# Patient Record
Sex: MALE | Race: WHITE | Employment: FULL TIME | URBAN - METROPOLITAN AREA
[De-identification: names, ages, dates, MRNs, and addresses within clinical notes are randomized per-mention and may not be internally consistent; named-entity substitution may affect disease eponyms.]

---

## 2017-03-29 ENCOUNTER — TELEPHONE (OUTPATIENT)
Dept: FAMILY MEDICINE | Facility: CLINIC | Age: 62
End: 2017-03-29

## 2017-03-29 NOTE — LETTER
[address] [id number] [phone]  Dept: [phone]      June 1, 2017      Vitaliy Marin  [address] [place] [address]    Dear Vitaliy Marin,     At [facility] we care about your health and are committed to providing quality patient care.    Which includes staying current on preventive cancer screenings.  You can increase your chances of finding and treating cancers through regular screenings.      Our records indicate you may be due for the following preventive screening(s):    Colonoscopy    Colonoscopy is recommended every ten years for everyone age 50 and older. Please take a moment to read over the enclosed information packet about colon cancer screening. We strongly urge our patient's to consider having a colonoscopy done, which is the best screening test available and only needs to be done every 10 years if normal. If you are unwilling or unable to have a colonoscopy then we recommend the annual stool testing for blood. This test is called a FIT test and it looks for blood in the stool.     To schedule an appointment for your colonoscopy, please see the attached referral.     To schedule an appointment or discuss this screening further, you may contact us by phone at the [facility] at [phone] or online through the patient portal/[email or web address]    If you have had any of the screenings listed above at another facility, please call us so that we may update your chart.      Your partners in health,      Quality Committee at [facility]

## 2017-03-29 NOTE — TELEPHONE ENCOUNTER
3/29/2017    Call Regarding Preventive Health Screening Colonoscopy    Attempt 1    Message with female    Comments:       Outreach   KV

## 2017-06-01 NOTE — TELEPHONE ENCOUNTER
Panel Management Review          Fail List measure:       Patient is due/failing the following:   COLONOSCOPY    Action needed:   none    Type of outreach:    Sent letter.    Questions for provider review:    None                                                                                                                                    Olinda Marks MA

## 2018-11-10 ENCOUNTER — OFFICE VISIT (OUTPATIENT)
Dept: URGENT CARE | Facility: URGENT CARE | Age: 63
End: 2018-11-10
Payer: COMMERCIAL

## 2018-11-10 VITALS
DIASTOLIC BLOOD PRESSURE: 102 MMHG | OXYGEN SATURATION: 95 % | WEIGHT: 237 LBS | TEMPERATURE: 98.3 F | SYSTOLIC BLOOD PRESSURE: 153 MMHG | HEART RATE: 64 BPM | BODY MASS INDEX: 30.1 KG/M2 | RESPIRATION RATE: 16 BRPM

## 2018-11-10 DIAGNOSIS — H57.8A9 SENSATION OF FOREIGN BODY IN EYE: Primary | ICD-10-CM

## 2018-11-10 DIAGNOSIS — S05.8X1A: ICD-10-CM

## 2018-11-10 PROCEDURE — 99214 OFFICE O/P EST MOD 30 MIN: CPT | Performed by: PHYSICIAN ASSISTANT

## 2018-11-10 RX ORDER — POLYMYXIN B SULFATE AND TRIMETHOPRIM 1; 10000 MG/ML; [USP'U]/ML
1 SOLUTION OPHTHALMIC EVERY 4 HOURS
Qty: 3 ML | Refills: 0 | Status: SHIPPED | OUTPATIENT
Start: 2018-11-10 | End: 2018-11-17

## 2018-11-10 NOTE — PROGRESS NOTES
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  He specifically feels a piece of wood in the right upper medial lid area.  He was sawing some wood yesterday and a piece flew underneath his active glasses.  He woke up in the middle of the night with quite a bit of pain.  He denies any decreased vision.  No fever.  No ST or otalgia        Allergies   Allergen Reactions     Penicillins Hives       No past medical history on file.      Current Outpatient Prescriptions on File Prior to Visit:  aspirin 325 MG tablet Take  by mouth daily.   clotrimazole (LOTRIMIN) 1 % cream Apply topically 2 times daily   diclofenac (VOLTAREN) 75 MG EC tablet Take 1 tablet (75 mg) by mouth 2 times daily (with meals) as needed for joint pain.     No current facility-administered medications on file prior to visit.     Social History   Substance Use Topics     Smoking status: Former Smoker     Smokeless tobacco: Never Used     Alcohol use Yes       ROS:  General: negative for fever  EYE: as above  ENT: as above    OBJECTIVE:  BP (!) 153/102 (BP Location: Left arm, Patient Position: Chair, Cuff Size: Adult Large)  Pulse 64  Temp 98.3  F (36.8  C) (Oral)  Resp 16  Wt 237 lb (107.5 kg)  SpO2 95%  BMI 30.1 kg/m2     General:   awake, alert, and cooperative.  NAD.   Head: Normocephalic, atraumatic.  Eyes: Right eye conjunctiva and sclera are with mild injection.  EOMs intact without pain.  Pupils equal reactive to light and accommodation.  Fluorescein dye and proparacaine drops are placed in the right eye.  With Woods lamp I see no uptake.  There is a diffuse bump in the inner right medial upper eyelid.  Almost looks like a stye.  The surface looks rough and irritated almost like an abrasion.  When I push on this area with a Q-tip he feels sudden pain.  He states this is the exact location where he feels the foreign body is at.  With palpation of the area I do not feel any wood foreign body object  underneath the skin.  The eye is flushed copiously with Eye Stream.       ASSESSMENT:    ICD-10-CM    1. Sensation of foreign body in eye H57.9 trimethoprim-polymyxin b (POLYTRIM) ophthalmic solution     OPTOMETRY REFERRAL   2. Eye injury, superficial, right, initial encounter S05.8X1A trimethoprim-polymyxin b (POLYTRIM) ophthalmic solution     OPTOMETRY REFERRAL           PLAN: Discussed that there could be a piece of wood that completely embedded underneath the skin.  I cannot rule this out here.  The other possibility is an abrasion and possibly early stye formation.  He is given antibiotic eyedrops.  If the pain does not get better throughout the day he is instructed to go to the emergency room.  Otherwise he is given referral for optometry to be seen on Monday.  Advised about symptoms which might herald more serious problems.   Shweta Ervin PA-C

## 2018-11-10 NOTE — MR AVS SNAPSHOT
After Visit Summary   11/10/2018    Vitaliy Marin    MRN: [id number]           Patient Information     Date Of Birth          1955        Visit Information        Provider Department      11/10/2018 9:15 AM Shweta Ervin PA-C [facility]        Today's Diagnoses     Sensation of foreign body in eye    -  1    Eye injury, superficial, right, initial encounter           Follow-ups after your visit        Additional Services     OPTOMETRY REFERRAL       Your provider has referred you to: [facility]: [facility] [phone]    [email or web address]    Please be aware that coverage of these services is subject to the terms and limitations of your health insurance plan.  Call member services at your health plan with any benefit or coverage questions.      Please bring the following with you to your appointment:    (1) Any X-Rays, CTs or MRIs which have been performed.  Contact the facility where they were done to arrange for  prior to your scheduled appointment.    (2) List of current medications  (3) This referral request   (4) Any documents/labs given to you for this referral                  Who to contact     If you have questions or need follow up information about today's clinic visit or your schedule please contact [facility] directly at [phone].  Normal or non-critical lab and imaging results will be communicated to you by MyChart, letter or phone within 4 business days after the clinic has received the results. If you do not hear from us within 7 days, please contact the clinic through MyChart or phone. If you have a critical or abnormal lab result, we will notify you by phone as soon as possible.  Submit refill requests through [employer] or call your pharmacy and they will forward the refill request to us. Please allow 3 business days for your refill to be completed.           Additional Information About Your Visit        Care EveryWhere ID     This is your Care EveryWhere ID. This could be used by other organizations to access your [facility] medical records  [id number]        Your Vitals Were     Pulse Temperature Respirations Pulse Oximetry BMI (Body Mass Index)       64 98.3  F (36.8  C) (Oral) 16 95% 30.1 kg/m2        Blood Pressure from Last 3 Encounters:   11/10/18 (!) 153/102   05/09/16 126/86   10/19/15 122/81    Weight from Last 3 Encounters:   11/10/18 237 lb (107.5 kg)   05/09/16 240 lb 12.8 oz (109.2 kg)   11/18/15 232 lb 6.4 oz (105.4 kg)              We Performed the Following     OPTOMETRY REFERRAL          Today's Medication Changes          These changes are accurate as of 11/10/18  9:33 AM.  If you have any questions, ask your nurse or doctor.               Start taking these medicines.        Dose/Directions    trimethoprim-polymyxin b ophthalmic solution   Commonly known as:  POLYTRIM   Used for:  Sensation of foreign body in eye, Eye injury, superficial, right, initial encounter   Started by:  Shweta Ervin PA-C        Dose:  1 drop   Place 1 drop into the right eye every 4 hours for 7 days   Quantity:  3 mL   Refills:  0            Where to get your medicines      These medications were sent to [facility] - [place], [place] - [address]  [address], [facility] [address]     Phone:  [phone]     trimethoprim-polymyxin b ophthalmic solution                Primary Care Provider Office Phone # Fax #    Hfzau Rey Currie [phone] [address] [facility] [address]        Equal Access to Services     CATERINA HANSEN AH: Hadii aad ku hadasho Soomaali, waaxda luqadaha, qaybta kaalmada adeegyada, miryam reaves haydaltonn lilliana pinon. So [facility] [phone].    ATENCIÓN: Si habla español, tiene a rivers disposición servicios gratuitos de asistencia lingüística. Llame al [phone].    We comply  with applicable federal civil rights laws and [place] laws. We do not discriminate on the basis of race, color, national origin, age, disability, sex, sexual orientation, or gender identity.            Thank you!     Thank you for choosing [facility]  for your care. Our goal is always to provide you with excellent care. Hearing back from our patients is one way we can continue to improve our services. Please take a few minutes to complete the written survey that you may receive in the mail after your visit with us. Thank you!             Your Updated Medication List - Protect others around you: Learn how to safely use, store and throw away your medicines at www.disposemymeds.org.          This list is accurate as of 11/10/18  9:33 AM.  Always use your most recent med list.                   Brand Name Dispense Instructions for use Diagnosis    aspirin 325 MG tablet      Take  by mouth daily.        clotrimazole 1 % cream    LOTRIMIN    15 g    Apply topically 2 times daily    Tinea corporis       diclofenac 75 MG EC tablet    VOLTAREN    60 tablet    Take 1 tablet (75 mg) by mouth 2 times daily (with meals) as needed for joint pain.    Baker's cyst of knee, right       trimethoprim-polymyxin b ophthalmic solution    POLYTRIM    3 mL    Place 1 drop into the right eye every 4 hours for 7 days    Sensation of foreign body in eye, Eye injury, superficial, right, initial encounter

## 2019-07-25 ENCOUNTER — OFFICE VISIT (OUTPATIENT)
Dept: OPTOMETRY | Facility: CLINIC | Age: 64
End: 2019-07-25
Payer: COMMERCIAL

## 2019-07-25 DIAGNOSIS — H01.02B SQUAMOUS BLEPHARITIS OF UPPER AND LOWER EYELIDS OF BOTH EYES: ICD-10-CM

## 2019-07-25 DIAGNOSIS — H01.02A SQUAMOUS BLEPHARITIS OF UPPER AND LOWER EYELIDS OF BOTH EYES: ICD-10-CM

## 2019-07-25 DIAGNOSIS — H00.014 HORDEOLUM EXTERNUM OF LEFT UPPER EYELID: Primary | ICD-10-CM

## 2019-07-25 PROCEDURE — 92002 INTRM OPH EXAM NEW PATIENT: CPT | Performed by: OPTOMETRIST

## 2019-07-25 ASSESSMENT — VISUAL ACUITY
OD_SC: 20/25
METHOD: SNELLEN - LINEAR
OD_SC+: -2
OS_SC: 20/20

## 2019-07-25 ASSESSMENT — TONOMETRY
OS_IOP_MMHG: 10
IOP_METHOD: APPLANATION

## 2019-07-25 ASSESSMENT — EXTERNAL EXAM - RIGHT EYE: OD_EXAM: NORMAL

## 2019-07-25 ASSESSMENT — EXTERNAL EXAM - LEFT EYE: OS_EXAM: NORMAL

## 2019-07-25 NOTE — PROGRESS NOTES
Chief Complaint   Patient presents with     Foreign Body in Eye     Foreign Body in Eye   HPI    Foreign Body in Eye      Laterality:  left eye     Duration:  9 months     Associated symptoms:  redness, photophobia, and lid swelling     Pain scale:  0/10     Treatments tried:  eye drops     Response to treatment:  no improvement   Comments      Patient felt FB in os eye in November 9 months ago under upper eyelid. Irritation comes and goes now lid swelling and bump on upper lid. Patient was given polymyxin b last November drops hurt to use.     Thu Day, Optometric Assistant, A.B.O.C.       Medical, surgical and family histories reviewed and updated 7/25/2019.       OBJECTIVE: See Ophthalmology exam    ASSESSMENT:    ICD-10-CM    1. Hordeolum externum of left upper eyelid H00.014 tobramycin-dexamethasone (TOBRADEX) 0.3-0.1 % ophthalmic ointment   2. Squamous blepharitis of upper and lower eyelids of both eyes H01.02A     H01.02B       PLAN:     Patient Instructions   Warm packs- 2-3 x day for 7-10 days.    Maxitrol ointment to eyelid 3 x day for 1 week.    Blink tears/Systane Balance- 1 drop both eyes 2-4 x daily.    Ocusoft Oust foaming eyelid cleanser which can be purchase on Amazon or Blephadex eyelid wipes- cleanse eyelids 2 x day for 6 weeks then nightly.  These can be purchased on Amazon.com.    Recommend annual eye exams.    Priyank Jenkins, OD

## 2019-07-25 NOTE — LETTER
7/25/2019         RE: Vitaliy Marin  [address] [address] [place] [address]        Dear Colleague,    Thank you for referring your patient, Vitaliy Marin, to the [facility]. Please see a copy of my visit note below.    Chief Complaint   Patient presents with     Foreign Body in Eye     Foreign Body in Eye   HPI    Foreign Body in Eye      Laterality:  left eye     Duration:  9 months     Associated symptoms:  redness, photophobia, and lid swelling     Pain scale:  0/10     Treatments tried:  eye drops     Response to treatment:  no improvement   Comments      Patient felt FB in os eye in November 9 months ago under upper eyelid. Irritation comes and goes now lid swelling and bump on upper lid. Patient was given polymyxin b last November drops hurt to use.     Thu Day, Optometric Assistant, A.B.O.C.       Medical, surgical and family histories reviewed and updated 7/25/2019.       OBJECTIVE: See Ophthalmology exam    ASSESSMENT:    ICD-10-CM    1. Hordeolum externum of left upper eyelid H00.014 tobramycin-dexamethasone (TOBRADEX) 0.3-0.1 % ophthalmic ointment   2. Squamous blepharitis of upper and lower eyelids of both eyes H01.02A     H01.02B       PLAN:     Patient Instructions   Warm packs- 2-3 x day for 7-10 days.    Maxitrol ointment to eyelid 3 x day for 1 week.    Blink tears/Systane Balance- 1 drop both eyes 2-4 x daily.    Ocusoft Oust foaming eyelid cleanser which can be purchase on Amazon or Blephadex eyelid wipes- cleanse eyelids 2 x day for 6 weeks then nightly.  These can be purchased on Amazon.com.    Recommend annual eye exams.    Priyank Jenkins, OD               Again, thank you for allowing me to participate in the care of your patient.        Sincerely,        Priyank Jenkins, OD

## 2019-07-25 NOTE — PATIENT INSTRUCTIONS
Warm packs- 2-3 x day for 7-10 days.    Maxitrol ointment to eyelid 3 x day for 1 week.    Blink tears/Systane Balance- 1 drop both eyes 2-4 x daily.    Ocusoft Oust foaming eyelid cleanser which can be purchase on Amazon or Blephadex eyelid wipes- cleanse eyelids 2 x day for 6 weeks then nightly.  These can be purchased on Amazon.com.    Recommend annual eye exams.    Priyank Jenkins, OD      Use one drop of artificial tears both eyes 3-4 x daily.  Continue to use the drops regardless if your eyes are comfortable.  Artificial tears work best as a preventative and not as well after your eyes are starting to bother you.  It may take 4- 6 weeks of using the drops before you notice improvement.  If after that time you are still having problems schedule an appointment for an evaluation and discussion of different treatments such as Restasis or Xiidra.  Dry eyes are a chronic condition and you may have more symptoms at certain times of the year.    Excess tearing can be due to the right tears not working properly or a blockage in the tear drainage system.  You can try using artificial tears 1 drop right eye 3-4 x day.  If the excess tearing is bothersome after 3-4 weeks and that doesn't help we can send you for further testing on the puncta which allows the tears to drain.  This would entail a referral to our oculo plastic specialist at the [facility].    Brands of drops that are recommended are:    Systane Complete  Systane Ultra  Systane Balance  Refresh Advanced Optive  Blink    If you are using drops more than 4 x day or have sensitivities to preservatives I recommend non preserved artificial tears.  These come in 1 use vials.  They can be used every 1-2 hours.    Brands of drops that are recommended are:    Systane- preservative free  Refresh-  preservative free  Blink- preservative free    Gels or ointment can be used at night.    Brands recommended are:    Systane Gel  Refresh Gel  Blink Gel  Genteal  Gel    Systane night time (ointment)  Refresh Celluvisc  Refresh PM (ointment)      Visine, Clear Eyes or Murine (drops that get the red out) can irritate the eyes and cause a rebound effect where the eyes become more red and you end up using more drops.  Avoid drops containing tetrahydrozoline, naphazoline, phenylephrine, oxymetazoline.      OTC Lumify is a newer product that gives immediate redness relief with out the rebound effect.  Use as needed to take the redness out.    Artificial tears may be used with other drops (such as allergy, glaucoma, antibiotics) around the same time.  Be sure to wait 5 minutes in between drops.    Heat to the eyelids can also improve your symptoms of dry eyes.  Mathew heat masks can be purchased at Amazon to be used daily for 10-15 minutes.  Other options are gel masks that can be put in the microwave and purchased at most pharmacies.

## 2019-12-03 ENCOUNTER — ANCILLARY PROCEDURE (OUTPATIENT)
Dept: GENERAL RADIOLOGY | Facility: CLINIC | Age: 64
End: 2019-12-03
Attending: PHYSICIAN ASSISTANT
Payer: COMMERCIAL

## 2019-12-03 ENCOUNTER — OFFICE VISIT (OUTPATIENT)
Dept: FAMILY MEDICINE | Facility: CLINIC | Age: 64
End: 2019-12-03
Payer: COMMERCIAL

## 2019-12-03 VITALS
TEMPERATURE: 98.3 F | DIASTOLIC BLOOD PRESSURE: 80 MMHG | HEART RATE: 65 BPM | OXYGEN SATURATION: 97 % | BODY MASS INDEX: 28.75 KG/M2 | WEIGHT: 224 LBS | SYSTOLIC BLOOD PRESSURE: 111 MMHG | HEIGHT: 74 IN

## 2019-12-03 DIAGNOSIS — R10.84 ABDOMINAL PAIN, GENERALIZED: Primary | ICD-10-CM

## 2019-12-03 DIAGNOSIS — R19.8 RUQ FULLNESS: ICD-10-CM

## 2019-12-03 DIAGNOSIS — R19.7 DIARRHEA, UNSPECIFIED TYPE: ICD-10-CM

## 2019-12-03 DIAGNOSIS — R10.84 ABDOMINAL PAIN, GENERALIZED: ICD-10-CM

## 2019-12-03 LAB
ERYTHROCYTE [DISTWIDTH] IN BLOOD BY AUTOMATED COUNT: 13.2 % (ref 10–15)
HCT VFR BLD AUTO: 43.4 % (ref 40–53)
HGB BLD-MCNC: 14.6 G/DL (ref 13.3–17.7)
MCH RBC QN AUTO: 29.9 PG (ref 26.5–33)
MCHC RBC AUTO-ENTMCNC: 33.6 G/DL (ref 31.5–36.5)
MCV RBC AUTO: 89 FL (ref 78–100)
PLATELET # BLD AUTO: 257 10E9/L (ref 150–450)
RBC # BLD AUTO: 4.89 10E12/L (ref 4.4–5.9)
WBC # BLD AUTO: 8 10E9/L (ref 4–11)

## 2019-12-03 PROCEDURE — 80048 BASIC METABOLIC PNL TOTAL CA: CPT | Performed by: PHYSICIAN ASSISTANT

## 2019-12-03 PROCEDURE — 99214 OFFICE O/P EST MOD 30 MIN: CPT | Performed by: PHYSICIAN ASSISTANT

## 2019-12-03 PROCEDURE — 83690 ASSAY OF LIPASE: CPT | Performed by: PHYSICIAN ASSISTANT

## 2019-12-03 PROCEDURE — 36415 COLL VENOUS BLD VENIPUNCTURE: CPT | Performed by: PHYSICIAN ASSISTANT

## 2019-12-03 PROCEDURE — 74019 RADEX ABDOMEN 2 VIEWS: CPT

## 2019-12-03 PROCEDURE — 85027 COMPLETE CBC AUTOMATED: CPT | Performed by: PHYSICIAN ASSISTANT

## 2019-12-03 PROCEDURE — 80076 HEPATIC FUNCTION PANEL: CPT | Performed by: PHYSICIAN ASSISTANT

## 2019-12-03 RX ORDER — ONDANSETRON 4 MG/1
4 TABLET, ORALLY DISINTEGRATING ORAL EVERY 8 HOURS PRN
Qty: 12 TABLET | Refills: 0 | Status: SHIPPED | OUTPATIENT
Start: 2019-12-03

## 2019-12-03 RX ORDER — ONDANSETRON 4 MG/1
4 TABLET, ORALLY DISINTEGRATING ORAL EVERY 8 HOURS PRN
Qty: 12 TABLET | Refills: 0 | Status: SHIPPED | OUTPATIENT
Start: 2019-12-03 | End: 2019-12-03

## 2019-12-03 ASSESSMENT — MIFFLIN-ST. JEOR: SCORE: 1880.81

## 2019-12-03 ASSESSMENT — PAIN SCALES - GENERAL: PAINLEVEL: NO PAIN (1)

## 2019-12-04 LAB
ALBUMIN SERPL-MCNC: 3.9 G/DL (ref 3.4–5)
ALP SERPL-CCNC: 63 U/L (ref 40–150)
ALT SERPL W P-5'-P-CCNC: 61 U/L (ref 0–70)
ANION GAP SERPL CALCULATED.3IONS-SCNC: 3 MMOL/L (ref 3–14)
AST SERPL W P-5'-P-CCNC: 40 U/L (ref 0–45)
BILIRUB DIRECT SERPL-MCNC: 0.1 MG/DL (ref 0–0.2)
BILIRUB SERPL-MCNC: 0.8 MG/DL (ref 0.2–1.3)
BUN SERPL-MCNC: 20 MG/DL (ref 7–30)
CALCIUM SERPL-MCNC: 9 MG/DL (ref 8.5–10.1)
CHLORIDE SERPL-SCNC: 109 MMOL/L (ref 94–109)
CO2 SERPL-SCNC: 28 MMOL/L (ref 20–32)
CREAT SERPL-MCNC: 1.1 MG/DL (ref 0.66–1.25)
GFR SERPL CREATININE-BSD FRML MDRD: 71 ML/MIN/{1.73_M2}
GLUCOSE SERPL-MCNC: 81 MG/DL (ref 70–99)
LIPASE SERPL-CCNC: 172 U/L (ref 73–393)
POTASSIUM SERPL-SCNC: 4.2 MMOL/L (ref 3.4–5.3)
PROT SERPL-MCNC: 7.2 G/DL (ref 6.8–8.8)
SODIUM SERPL-SCNC: 140 MMOL/L (ref 133–144)

## 2019-12-04 NOTE — PATIENT INSTRUCTIONS
Please call [facility] at [phone] to schedule your appointment for US of the gallbladder     Take Zofran 4 mg every 8 hours as needed for nausea  Take Metamucil 1 tbls daily   Hydrate   Gas X for gas pains

## 2019-12-05 ENCOUNTER — ANCILLARY PROCEDURE (OUTPATIENT)
Dept: ULTRASOUND IMAGING | Facility: CLINIC | Age: 64
End: 2019-12-05
Attending: PHYSICIAN ASSISTANT
Payer: COMMERCIAL

## 2019-12-05 DIAGNOSIS — R19.8 RUQ FULLNESS: ICD-10-CM

## 2019-12-05 PROCEDURE — 76700 US EXAM ABDOM COMPLETE: CPT

## 2020-11-24 ENCOUNTER — VIRTUAL VISIT (OUTPATIENT)
Dept: FAMILY MEDICINE | Facility: OTHER | Age: 65
End: 2020-11-24

## 2020-11-24 DIAGNOSIS — Z20.822 SUSPECTED COVID-19 VIRUS INFECTION: Primary | ICD-10-CM

## 2020-11-24 NOTE — PROGRESS NOTES
"Date: 2020 10:37:21  Clinician: Jordan Gregory  Clinician NPI: [id number]  Patient: Vitaliy Marin  Patient : 1955  Patient Address: [address] [place] [address] [place] [address]  Patient Phone: [phone]  Visit Protocol: URI  Patient Summary:  Vitaliy is a 65 year old ( : 1955 ) male who initiated a [facility] Visit for COVID-19 (Coronavirus) evaluation and screening. When asked the question \"Please sign me up to receive news, health information and promotions from [facility].\", Vitaliy responded \"Yes\".    Vitaliy states his symptoms started 1-2 days ago.   His symptoms consist of rhinitis, myalgia, chills, malaise, a headache, a cough, and nasal congestion. Vitaliy also feels feverish.   Symptom details     Nasal secretions: The color of his mucus is white.    Cough: Vitaliy coughs every 5-10 minutes and his cough is not more bothersome at night. Phlegm does not come into his throat when he coughs. He believes his cough is caused by post-nasal drip.     Temperature: His current temperature is 100 degrees Fahrenheit.     Headache: He states the headache is mild (1-3 on a 10 point pain scale).      Vitaliy denies having vomiting, facial pain or pressure, sore throat, teeth pain, ageusia, diarrhea, ear pain, wheezing, nausea, and anosmia. He also denies taking antibiotic medication in the past month, having recent facial or sinus surgery in the past 60 days, and having a sinus infection within the past year. He is not experiencing dyspnea.   Precipitating events  He has not recently been exposed to someone with influenza. Vitaliy has not been in close contact with any high risk individuals.   Pertinent COVID-19 (Coronavirus) information  Vitaliy does not work or volunteer as healthcare worker or a . In the past 14 days, Vitaliy has not worked or volunteered at a healthcare facility or group living setting.   In the past 14 days, he also has not lived in a congregate living setting.   Vitaliy has " not had a close contact with a laboratory-confirmed COVID-19 patient within 14 days of symptom onset.    Since December 2019, Vitaliy has not been tested for COVID-19 and has not had upper respiratory infection or influenza-like illness.   Pertinent medical history  Vitaliy does not need a return to work/school note.   Weight: 225 lbs   Vitaliy does not smoke or use smokeless tobacco.   Weight: 225 lbs    MEDICATIONS: No current medications, ALLERGIES: Penicillins  Clinician Response:  Dear Vitaliy,   Your symptoms show that you may have coronavirus (COVID-19). This illness can cause fever, cough and trouble breathing. Many people get a mild case and get better on their own. Some people can get very sick.  What should I do?  We would like to test you for this virus.   1. Please call [phone] to schedule your visit. Explain that you were referred by [facility] to have a COVID-19 test. Be ready to share your [facility] visit ID number.  * If you need to schedule in [facility] please call [phone] or for [place] employees please call [phone].  * If you need to schedule in the [place] area please call [phone]. [place] employees call [phone].  The following will serve as your written order for this COVID Test, ordered by me, for the indication of suspected COVID [Z20.828]: The test will be ordered in [employer], our electronic health record, after you are scheduled. It will show as ordered and authorized by Frank Solano MD.  Order: COVID-19 (Coronavirus) PCR for SYMPTOMATIC testing from [facility].   2. When it's time for your COVID test:  Stay at least 6 feet away from others. (If someone will drive you to your test, stay in the backseat, as far away from the  as you can.)   Cover your mouth and nose with a mask, tissue or washcloth.  Go straight to the testing site. Don't make any stops on the way there or back.      3.Starting now: Stay home and away from others (self-isolate) until:   You've had no  "fever---and no medicine that reduces fever---for one full day (24 hours). And...   Your other symptoms have gotten better. For example, your cough or breathing has improved. And...   At least 10 days have passed since your symptoms started.       During this time, don't leave the house except for testing or medical care.   Stay in your own room, even for meals. Use your own bathroom if you can.   Stay away from others in your home. No hugging, kissing or shaking hands. No visitors.  Don't go to work, school or anywhere else.    Clean \"high touch\" surfaces often (doorknobs, counters, handles, etc.). Use a household cleaning spray or wipes. You'll find a full list of  on the EPA website: www.epa.gov/pesticide-registration/list-n-disinfectants-use-against-sars-cov-2.   Cover your mouth and nose with a mask, tissue or washcloth to avoid spreading germs.  Wash your hands and face often. Use soap and water.  Caregivers in these groups are at risk for severe illness due to COVID-19:  o People 65 years and older  o People who live in a nursing home or long-term care facility  o People with chronic disease (lung, heart, cancer, diabetes, kidney, liver, immunologic)  o People who have a weakened immune system, including those who:   Are in cancer treatment  Take medicine that weakens the immune system, such as corticosteroids  Had a bone marrow or organ transplant  Have an immune deficiency  Have poorly controlled HIV or AIDS  Are obese (body mass index of 40 or higher)  Smoke regularly   o Caregivers should wear gloves while washing dishes, handling laundry and cleaning bedrooms and bathrooms.  o Use caution when washing and drying laundry: Don't shake dirty laundry, and use the warmest water setting that you can.  o For more tips, go to www.cdc.gov/coronavirus/2019-ncov/downloads/10Things.pdf.    How can I take care of myself?    Get lots of rest. Drink extra fluids (unless a doctor has told you not to).   Take " Tylenol (acetaminophen) for fever or pain. If you have liver or kidney problems, ask your family doctor if it's okay to take Tylenol.   Adults can take either:    650 mg (two 325 mg pills) every 4 to 6 hours, or...   1,000 mg (two 500 mg pills) every 8 hours as needed.    Note: Don't take more than 3,000 mg in one day. Acetaminophen is found in many medicines (both prescribed and over-the-counter medicines). Read all labels to be sure you don't take too much.   For children, check the Tylenol bottle for the right dose. The dose is based on the child's age or weight.    If you have other health problems (like cancer, heart failure, an organ transplant or severe kidney disease): Call your specialty clinic if you don't feel better in the next 2 days.       Know when to call 911. Emergency warning signs include:    Trouble breathing or shortness of breath Pain or pressure in the chest that doesn't go away Feeling confused like you haven't felt before, or not being able to wake up Bluish-colored lips or face.  Where can I get more information?   [facility] -- About COVID-19: [email or web address]   CDC -- What to Do If You're Sick: www.cdc.gov/coronavirus/2019-ncov/about/steps-when-sick.html   CDC -- Ending Home Isolation: www.cdc.gov/coronavirus/2019-ncov/hcp/disposition-in-home-patients.html   CDC -- Caring for Someone: www.cdc.gov/coronavirus/2019-ncov/if-you-are-sick/care-for-someone.html   [facility] -- Interim Guidance for Hospital Discharge to Home: www.health.[facility].[place]/diseases/coronavirus/hcp/hospdischarge.pdf   [facility] clinical trials (COVID-19 research studies): clinicalaffairs.[email or web address]/umn-clinical-trials    Below are the COVID-19 hotlines at the [place] Department of [facility] ([facility]). Interpreters are available.    For health questions: Call 930-594-8909 or 1-982.926.1780 (7 a.m. to 7 p.m.) For questions about schools and childcare: Call 796-541-5896 or 1-973.626.1385 (7 a.m. to 7  p.m.)    Diagnosis: Contact with and (suspected) exposure to other viral communicable diseases  Diagnosis ICD: Z20.828

## 2020-11-25 DIAGNOSIS — Z20.822 SUSPECTED COVID-19 VIRUS INFECTION: ICD-10-CM

## 2020-11-25 PROCEDURE — U0003 INFECTIOUS AGENT DETECTION BY NUCLEIC ACID (DNA OR RNA); SEVERE ACUTE RESPIRATORY SYNDROME CORONAVIRUS 2 (SARS-COV-2) (CORONAVIRUS DISEASE [COVID-19]), AMPLIFIED PROBE TECHNIQUE, MAKING USE OF HIGH THROUGHPUT TECHNOLOGIES AS DESCRIBED BY CMS-2020-01-R: HCPCS | Performed by: PHYSICIAN ASSISTANT

## 2020-11-26 LAB
SARS-COV-2 RNA SPEC QL NAA+PROBE: ABNORMAL
SPECIMEN SOURCE: ABNORMAL

## 2020-11-27 ENCOUNTER — TELEPHONE (OUTPATIENT)
Dept: LAB | Facility: CLINIC | Age: 65
End: 2020-11-27

## 2020-11-27 NOTE — TELEPHONE ENCOUNTER
Coronavirus (COVID-19) Notification    Reason for call  Notify of POSITIVE  COVID-19 lab result, assess symptoms,  review [facility] recommendations    Lab Result   Lab test for 2019-nCoV rRt-PCR or SARS-COV-2 PCR  Oropharyngeal AND/OR nasopharyngeal swabs were POSITIVE for 2019-nCoV RNA [OR] SARS-COV-2 RNA (COVID-19) RNA     We have been unable to reach Patient by phone at this time to notify of their Positive COVID-19 result.  Left voicemail message requesting a call back to [phone] [facility] for results.        POSITIVE COVID-19 Letter sent.    Viry Rodas, MSN, RN

## 2020-12-13 ENCOUNTER — HEALTH MAINTENANCE LETTER (OUTPATIENT)
Age: 65
End: 2020-12-13

## 2021-06-09 NOTE — LETTER
December 5, 2019      Vitaliy Cooklund  [address] [place] [address]        Dear ,    We are writing to inform you of your test results.    All blood work is within normal limits.     Diana Spain PAC     Results for orders placed or performed in visit on 12/03/19   CBC with platelets     Status: None   Result Value Ref Range    WBC 8.0 4.0 - 11.0 10e9/L    RBC Count 4.89 4.4 - 5.9 10e12/L    Hemoglobin 14.6 13.3 - 17.7 g/dL    Hematocrit 43.4 40.0 - 53.0 %    MCV 89 78 - 100 fl    MCH 29.9 26.5 - 33.0 pg    MCHC 33.6 31.5 - 36.5 g/dL    RDW 13.2 10.0 - 15.0 %    Platelet Count 257 150 - 450 10e9/L   Hepatic panel (Albumin, ALT, AST, Bili, Alk Phos, TP)     Status: None   Result Value Ref Range    Bilirubin Direct 0.1 0.0 - 0.2 mg/dL    Bilirubin Total 0.8 0.2 - 1.3 mg/dL    Albumin 3.9 3.4 - 5.0 g/dL    Protein Total 7.2 6.8 - 8.8 g/dL    Alkaline Phosphatase 63 40 - 150 U/L    ALT 61 0 - 70 U/L    AST 40 0 - 45 U/L   Basic metabolic panel  (Ca, Cl, CO2, Creat, Gluc, K, Na, BUN)     Status: None   Result Value Ref Range    Sodium 140 133 - 144 mmol/L    Potassium 4.2 3.4 - 5.3 mmol/L    Chloride 109 94 - 109 mmol/L    Carbon Dioxide 28 20 - 32 mmol/L    Anion Gap 3 3 - 14 mmol/L    Glucose 81 70 - 99 mg/dL    Urea Nitrogen 20 7 - 30 mg/dL    Creatinine 1.10 0.66 - 1.25 mg/dL    GFR Estimate 71 >60 mL/min/[1.73_m2]    GFR Estimate If Black 82 >60 mL/min/[1.73_m2]    Calcium 9.0 8.5 - 10.1 mg/dL   Lipase     Status: None   Result Value Ref Range    Lipase 172 73 - 393 U/L       
decreased step length/decreased weight-shifting ability

## 2021-06-28 ENCOUNTER — IMMUNIZATION (OUTPATIENT)
Dept: NURSING | Facility: CLINIC | Age: 66
End: 2021-06-28
Payer: COMMERCIAL

## 2021-06-28 PROCEDURE — 91300 PR COVID VAC PFIZER DIL RECON 30 MCG/0.3 ML IM: CPT

## 2021-06-28 PROCEDURE — 0001A PR COVID VAC PFIZER DIL RECON 30 MCG/0.3 ML IM: CPT

## 2021-07-19 ENCOUNTER — IMMUNIZATION (OUTPATIENT)
Dept: NURSING | Facility: CLINIC | Age: 66
End: 2021-07-19
Attending: FAMILY MEDICINE
Payer: COMMERCIAL

## 2021-07-19 PROCEDURE — 91300 PR COVID VAC PFIZER DIL RECON 30 MCG/0.3 ML IM: CPT

## 2021-07-19 PROCEDURE — 0002A PR COVID VAC PFIZER DIL RECON 30 MCG/0.3 ML IM: CPT

## 2021-09-26 ENCOUNTER — HEALTH MAINTENANCE LETTER (OUTPATIENT)
Age: 66
End: 2021-09-26

## 2021-11-21 ENCOUNTER — HEALTH MAINTENANCE LETTER (OUTPATIENT)
Age: 66
End: 2021-11-21

## 2023-01-14 ENCOUNTER — HEALTH MAINTENANCE LETTER (OUTPATIENT)
Age: 68
End: 2023-01-14

## 2024-02-11 ENCOUNTER — HEALTH MAINTENANCE LETTER (OUTPATIENT)
Age: 69
End: 2024-02-11